# Patient Record
Sex: FEMALE | Race: WHITE | NOT HISPANIC OR LATINO | URBAN - METROPOLITAN AREA
[De-identification: names, ages, dates, MRNs, and addresses within clinical notes are randomized per-mention and may not be internally consistent; named-entity substitution may affect disease eponyms.]

---

## 2019-08-24 ENCOUNTER — INPATIENT (INPATIENT)
Facility: HOSPITAL | Age: 54
LOS: 0 days | Discharge: HOME | End: 2019-08-25
Attending: INTERNAL MEDICINE | Admitting: INTERNAL MEDICINE
Payer: MEDICAID

## 2019-08-24 VITALS
SYSTOLIC BLOOD PRESSURE: 135 MMHG | DIASTOLIC BLOOD PRESSURE: 71 MMHG | TEMPERATURE: 98 F | RESPIRATION RATE: 19 BRPM | OXYGEN SATURATION: 91 % | HEART RATE: 92 BPM

## 2019-08-24 DIAGNOSIS — Z98.891 HISTORY OF UTERINE SCAR FROM PREVIOUS SURGERY: Chronic | ICD-10-CM

## 2019-08-24 LAB
ALBUMIN SERPL ELPH-MCNC: 4.9 G/DL — SIGNIFICANT CHANGE UP (ref 3.5–5.2)
ALP SERPL-CCNC: 80 U/L — SIGNIFICANT CHANGE UP (ref 30–115)
ALT FLD-CCNC: 29 U/L — SIGNIFICANT CHANGE UP (ref 0–41)
ANION GAP SERPL CALC-SCNC: 12 MMOL/L — SIGNIFICANT CHANGE UP (ref 7–14)
AST SERPL-CCNC: 31 U/L — SIGNIFICANT CHANGE UP (ref 0–41)
BILIRUB SERPL-MCNC: <0.2 MG/DL — SIGNIFICANT CHANGE UP (ref 0.2–1.2)
BUN SERPL-MCNC: 15 MG/DL — SIGNIFICANT CHANGE UP (ref 10–20)
CALCIUM SERPL-MCNC: 10.1 MG/DL — SIGNIFICANT CHANGE UP (ref 8.5–10.1)
CHLORIDE SERPL-SCNC: 104 MMOL/L — SIGNIFICANT CHANGE UP (ref 98–110)
CO2 SERPL-SCNC: 26 MMOL/L — SIGNIFICANT CHANGE UP (ref 17–32)
CREAT SERPL-MCNC: 0.7 MG/DL — SIGNIFICANT CHANGE UP (ref 0.7–1.5)
GLUCOSE SERPL-MCNC: 105 MG/DL — HIGH (ref 70–99)
HCG SERPL QL: NEGATIVE — SIGNIFICANT CHANGE UP
HCT VFR BLD CALC: 44.8 % — SIGNIFICANT CHANGE UP (ref 37–47)
HGB BLD-MCNC: 14.5 G/DL — SIGNIFICANT CHANGE UP (ref 12–16)
MCHC RBC-ENTMCNC: 32.4 G/DL — SIGNIFICANT CHANGE UP (ref 32–37)
MCHC RBC-ENTMCNC: 34.5 PG — HIGH (ref 27–31)
MCV RBC AUTO: 106.7 FL — HIGH (ref 81–99)
NRBC # BLD: 0 /100 WBCS — SIGNIFICANT CHANGE UP (ref 0–0)
PLATELET # BLD AUTO: 292 K/UL — SIGNIFICANT CHANGE UP (ref 130–400)
POTASSIUM SERPL-MCNC: 4.7 MMOL/L — SIGNIFICANT CHANGE UP (ref 3.5–5)
POTASSIUM SERPL-SCNC: 4.7 MMOL/L — SIGNIFICANT CHANGE UP (ref 3.5–5)
PROT SERPL-MCNC: 7.6 G/DL — SIGNIFICANT CHANGE UP (ref 6–8)
RBC # BLD: 4.2 M/UL — SIGNIFICANT CHANGE UP (ref 4.2–5.4)
RBC # FLD: 12.5 % — SIGNIFICANT CHANGE UP (ref 11.5–14.5)
SODIUM SERPL-SCNC: 142 MMOL/L — SIGNIFICANT CHANGE UP (ref 135–146)
TROPONIN T SERPL-MCNC: <0.01 NG/ML — SIGNIFICANT CHANGE UP
WBC # BLD: 6.8 K/UL — SIGNIFICANT CHANGE UP (ref 4.8–10.8)
WBC # FLD AUTO: 6.8 K/UL — SIGNIFICANT CHANGE UP (ref 4.8–10.8)

## 2019-08-24 PROCEDURE — 93010 ELECTROCARDIOGRAM REPORT: CPT

## 2019-08-24 PROCEDURE — 99285 EMERGENCY DEPT VISIT HI MDM: CPT

## 2019-08-24 PROCEDURE — 71046 X-RAY EXAM CHEST 2 VIEWS: CPT | Mod: 26

## 2019-08-24 RX ORDER — IPRATROPIUM/ALBUTEROL SULFATE 18-103MCG
3 AEROSOL WITH ADAPTER (GRAM) INHALATION ONCE
Refills: 0 | Status: COMPLETED | OUTPATIENT
Start: 2019-08-24 | End: 2019-08-24

## 2019-08-24 RX ORDER — ALPRAZOLAM 0.25 MG
0.5 TABLET ORAL ONCE
Refills: 0 | Status: DISCONTINUED | OUTPATIENT
Start: 2019-08-24 | End: 2019-08-24

## 2019-08-24 RX ORDER — FLUTICASONE FUROATE AND VILANTEROL TRIFENATATE 100; 25 UG/1; UG/1
1 POWDER RESPIRATORY (INHALATION)
Qty: 0 | Refills: 0 | DISCHARGE

## 2019-08-24 RX ORDER — BUDESONIDE AND FORMOTEROL FUMARATE DIHYDRATE 160; 4.5 UG/1; UG/1
2 AEROSOL RESPIRATORY (INHALATION)
Refills: 0 | Status: DISCONTINUED | OUTPATIENT
Start: 2019-08-24 | End: 2019-08-25

## 2019-08-24 RX ORDER — IPRATROPIUM/ALBUTEROL SULFATE 18-103MCG
3 AEROSOL WITH ADAPTER (GRAM) INHALATION EVERY 6 HOURS
Refills: 0 | Status: DISCONTINUED | OUTPATIENT
Start: 2019-08-24 | End: 2019-08-25

## 2019-08-24 RX ORDER — NICOTINE POLACRILEX 2 MG
1 GUM BUCCAL DAILY
Refills: 0 | Status: DISCONTINUED | OUTPATIENT
Start: 2019-08-24 | End: 2019-08-25

## 2019-08-24 RX ORDER — MAGNESIUM SULFATE 500 MG/ML
2 VIAL (ML) INJECTION ONCE
Refills: 0 | Status: COMPLETED | OUTPATIENT
Start: 2019-08-24 | End: 2019-08-24

## 2019-08-24 RX ORDER — ENOXAPARIN SODIUM 100 MG/ML
40 INJECTION SUBCUTANEOUS DAILY
Refills: 0 | Status: DISCONTINUED | OUTPATIENT
Start: 2019-08-24 | End: 2019-08-25

## 2019-08-24 RX ORDER — ALBUTEROL 90 UG/1
10 AEROSOL, METERED ORAL ONCE
Refills: 0 | Status: COMPLETED | OUTPATIENT
Start: 2019-08-24 | End: 2019-08-24

## 2019-08-24 RX ADMIN — Medication 125 MILLIGRAM(S): at 17:56

## 2019-08-24 RX ADMIN — Medication 50 GRAM(S): at 18:53

## 2019-08-24 RX ADMIN — Medication 3 MILLILITER(S): at 17:56

## 2019-08-24 RX ADMIN — Medication 3 MILLILITER(S): at 18:53

## 2019-08-24 RX ADMIN — Medication 3 MILLILITER(S): at 18:12

## 2019-08-24 RX ADMIN — ALBUTEROL 10 MILLIGRAM(S): 90 AEROSOL, METERED ORAL at 19:53

## 2019-08-24 NOTE — H&P ADULT - ASSESSMENT
54y F w/ PMH of COPD, DLD presents with SOB for the past 3 days.     #COPD exacerbation   - C/w supplemental o2 prn , duonebs, IV steroids  - Will need outpatient pulmonary referral as she does not currently have a pulmonologist  - Anticipate dc in am on prednisone taper if symptomatically improved    DVT PPx: Lovenox sc  DASH diet  Full code  Ambulate as tolerated

## 2019-08-24 NOTE — H&P ADULT - NSHPLABSRESULTS_GEN_ALL_CORE
Vitals:    Vital Signs Last 24 Hrs  T(C): 37 (24 Aug 2019 21:44), Max: 37 (24 Aug 2019 21:44)  T(F): 98.6 (24 Aug 2019 21:44), Max: 98.6 (24 Aug 2019 21:44)  HR: 86 (24 Aug 2019 21:44) (86 - 92)  BP: 166/67 (24 Aug 2019 21:44) (135/71 - 166/67)  BP(mean): --  RR: 20 (24 Aug 2019 21:44) (19 - 20)  SpO2: 91% (24 Aug 2019 21:44) (91% - 91%)    Labs:     08-24    142  |  104  |  15  ----------------------------<  105<H>  4.7   |  26  |  0.7    Ca    10.1      24 Aug 2019 18:04    TPro  7.6  /  Alb  4.9  /  TBili  <0.2  /  DBili  x   /  AST  31  /  ALT  29  /  AlkPhos  80  08-24                          14.5   6.80  )-----------( 292      ( 24 Aug 2019 18:04 )             44.8     CARDIAC MARKERS ( 24 Aug 2019 18:04 )  x     / <0.01 ng/mL / x     / x     / x          LIVER FUNCTIONS - ( 24 Aug 2019 18:04 )  Alb: 4.9 g/dL / Pro: 7.6 g/dL / ALK PHOS: 80 U/L / ALT: 29 U/L / AST: 31 U/L / GGT: x

## 2019-08-24 NOTE — H&P ADULT - NSHPPHYSICALEXAM_GEN_ALL_CORE
PHYSICAL EXAM:  GEN: No acute distress  LUNGS: scattered wheezing  HEART: S1/S2 present. RRR.  ABD: Soft, non-tender, non-distended. Bowel sounds present  MSK: NC/NC/NE/2+PP/THOMPSON  NEURO: AAOX3, non-focal

## 2019-08-24 NOTE — H&P ADULT - HISTORY OF PRESENT ILLNESS
54y F w/ PMH of COPD, DLD presents with SOB for the past 3 days. Patient has recent URI with nasal congestion, productive cough for 3 days with associated SOB on exertion and wheezing. She went to  today was given neb treatment and was found to have low oxygen saturation on pulse ox and referred to ED for further management. Her symptoms improved. She denies any fevers, chills, CP, n/v/d/c, dysuria.     In ED vitals 166/67, HR 86, 91% on RA, afebrile

## 2019-08-24 NOTE — ED PROVIDER NOTE - PHYSICAL EXAMINATION
CONSTITUTIONAL: Well-developed; well-nourished; in no acute distress.   SKIN: warm, dry  CARD: S1, S2 normal; no murmurs, gallops, or rubs. Regular rate and rhythm.   RESP: Moderate expiratory wheeze diffusely. No rales or rhonchi.  ABD: soft ntnd  EXT: Normal ROM.  No clubbing, cyanosis or edema.   LYMPH: No acute cervical adenopathy.  NEURO: Alert, oriented, grossly unremarkable  PSYCH: Cooperative, appropriate.

## 2019-08-24 NOTE — ED PROVIDER NOTE - CLINICAL SUMMARY MEDICAL DECISION MAKING FREE TEXT BOX
54y F w/ PMH of COPD presents with SOB for the past 3 days. Had associated nasal congestion, cough, and sore throat at symptom onset. Went to  given duo nebs, noted to have sat @ 90% and sent to ED for eval. Vitals noted to have hypoxia @ 90%- goes up to 96% on. Physical- with diffuse wheeze. P EKG ordered, documented above. For imaging we ordered a CXR, and my preliminary read did not reveal, a ptx, infiltrates, or free air. Labs ordered and noted to be within normal limits. Despite persistent tx with duo neb and albuterol x 1 hr, pt had persistent wheezing and PF of 150. After the workup the decision was made admit the patient for asthma exacerbation. Extensive discussion had with the patient/family.

## 2019-08-24 NOTE — ED PROVIDER NOTE - OBJECTIVE STATEMENT
54y F w/ PMH of COPD presents with SOB for the past 3 days. Had associated nasal congestion, cough, and sore throat at symptom onset. States has been getting progressively worse. Went to urgent care and received breathing treatments with improvement. Was referred to ED due to low sat reading. Denies headache, fever, chills, CP, n/v, abd pain, or numbness/tingling.

## 2019-08-24 NOTE — ED PROVIDER NOTE - PROGRESS NOTE DETAILS
Pt refusing ABG at this time. Explained the benefit of assessing breathing status. Pt understands and would still like to refuse at this time.

## 2019-08-24 NOTE — ED PROVIDER NOTE - ATTENDING CONTRIBUTION TO CARE
HPI-As noted above, interviewed the patient myself & agreed w/ findings, additionally:54y F w/ PMH of COPD presents with SOB for the past 3 days. Had associated nasal congestion, cough, and sore throat at symptom onset. Went to  given duo nebs, noted to have sat @ 90% and sent to ED for eval. On my eval pt sitting comfortably In bed, speaking in full scentences, no retratcions.     ROS- As noted above & additionally:   (Positive): cough,sob    (Negative):  fever, chills, n/v, cp,  pleuritic cp, palpitations, diaphoresis, ha/dizziness, numbness/tingling, neck pain/ stiffness, abd pain, diarrhea, constipation, melena/brbpr, urinary symptoms, trauma, weakness, edema, calf pain/swelling/erythema, sick contacts, recent travel or rash.    Vital Signs: I have reviewed the initial VS.     PE- As noted above additionally:  General: Awake, alert, (-) acute distress  Eyes: PERRL, EOMI, nl  lids & conjunctivae, (-) icterus  ENT: nl ext inspection, pink/moist membranes, pharynx nl, (-) pharyngeal erythema/exudate  CV: S1S2, RRR, 2+pulses b/l, warm/well-perfused, (-) edema, (-) murmur/gallops/rubs/JVD  Respiratory: (+) wheezing diffuse bilat, (-)rales/rhonchi, nl voice, speaking full sentences, no retractions, no stridor  Abdomen: Soft, nl BS, (-)tender, (-)distended/guarding/rebound/CVA tenderness  Musculoskeletal: FROM all 4 extremities, N/V intact  Neck: FROM neck, supple, trachea midline (-) lymphadenopathy  Integumentary: nl color for race, warm and dry, (-)rash  Neuro: Oriented x3, CN 2-12 grossly intact motor/sensory  Psych: Oriented x3, nl normal/affect  LABS/ IMAGING- pCXR, pLabs, p Peak flow  RX-Duo nebs x 1hr, MgSo4, Steroids

## 2019-08-24 NOTE — H&P ADULT - ATTENDING COMMENTS
I agree with the above history ,physical and plan which I Have reviewed and examined independently  further plan pl see my notes documented separately

## 2019-08-25 VITALS — WEIGHT: 164.02 LBS | HEIGHT: 67 IN

## 2019-08-25 DIAGNOSIS — J44.1 CHRONIC OBSTRUCTIVE PULMONARY DISEASE WITH (ACUTE) EXACERBATION: ICD-10-CM

## 2019-08-25 LAB
ANION GAP SERPL CALC-SCNC: 13 MMOL/L — SIGNIFICANT CHANGE UP (ref 7–14)
BASOPHILS # BLD AUTO: 0.01 K/UL — SIGNIFICANT CHANGE UP (ref 0–0.2)
BASOPHILS NFR BLD AUTO: 0.1 % — SIGNIFICANT CHANGE UP (ref 0–1)
BUN SERPL-MCNC: 18 MG/DL — SIGNIFICANT CHANGE UP (ref 10–20)
CALCIUM SERPL-MCNC: 9.6 MG/DL — SIGNIFICANT CHANGE UP (ref 8.5–10.1)
CHLORIDE SERPL-SCNC: 105 MMOL/L — SIGNIFICANT CHANGE UP (ref 98–110)
CO2 SERPL-SCNC: 25 MMOL/L — SIGNIFICANT CHANGE UP (ref 17–32)
CREAT SERPL-MCNC: 0.6 MG/DL — LOW (ref 0.7–1.5)
EOSINOPHIL # BLD AUTO: 0 K/UL — SIGNIFICANT CHANGE UP (ref 0–0.7)
EOSINOPHIL NFR BLD AUTO: 0 % — SIGNIFICANT CHANGE UP (ref 0–8)
GLUCOSE SERPL-MCNC: 180 MG/DL — HIGH (ref 70–99)
HCT VFR BLD CALC: 39.9 % — SIGNIFICANT CHANGE UP (ref 37–47)
HGB BLD-MCNC: 13 G/DL — SIGNIFICANT CHANGE UP (ref 12–16)
IMM GRANULOCYTES NFR BLD AUTO: 0.3 % — SIGNIFICANT CHANGE UP (ref 0.1–0.3)
LYMPHOCYTES # BLD AUTO: 1.32 K/UL — SIGNIFICANT CHANGE UP (ref 1.2–3.4)
LYMPHOCYTES # BLD AUTO: 19.2 % — LOW (ref 20.5–51.1)
MCHC RBC-ENTMCNC: 32.6 G/DL — SIGNIFICANT CHANGE UP (ref 32–37)
MCHC RBC-ENTMCNC: 34.6 PG — HIGH (ref 27–31)
MCV RBC AUTO: 106.1 FL — HIGH (ref 81–99)
MONOCYTES # BLD AUTO: 0.32 K/UL — SIGNIFICANT CHANGE UP (ref 0.1–0.6)
MONOCYTES NFR BLD AUTO: 4.7 % — SIGNIFICANT CHANGE UP (ref 1.7–9.3)
NEUTROPHILS # BLD AUTO: 5.2 K/UL — SIGNIFICANT CHANGE UP (ref 1.4–6.5)
NEUTROPHILS NFR BLD AUTO: 75.7 % — HIGH (ref 42.2–75.2)
NRBC # BLD: 0 /100 WBCS — SIGNIFICANT CHANGE UP (ref 0–0)
PLATELET # BLD AUTO: 287 K/UL — SIGNIFICANT CHANGE UP (ref 130–400)
POTASSIUM SERPL-MCNC: 4.5 MMOL/L — SIGNIFICANT CHANGE UP (ref 3.5–5)
POTASSIUM SERPL-SCNC: 4.5 MMOL/L — SIGNIFICANT CHANGE UP (ref 3.5–5)
RBC # BLD: 3.76 M/UL — LOW (ref 4.2–5.4)
RBC # FLD: 12.8 % — SIGNIFICANT CHANGE UP (ref 11.5–14.5)
SODIUM SERPL-SCNC: 143 MMOL/L — SIGNIFICANT CHANGE UP (ref 135–146)
WBC # BLD: 6.87 K/UL — SIGNIFICANT CHANGE UP (ref 4.8–10.8)
WBC # FLD AUTO: 6.87 K/UL — SIGNIFICANT CHANGE UP (ref 4.8–10.8)

## 2019-08-25 RX ORDER — IPRATROPIUM/ALBUTEROL SULFATE 18-103MCG
3 AEROSOL WITH ADAPTER (GRAM) INHALATION
Qty: 0 | Refills: 0 | DISCHARGE
Start: 2019-08-25

## 2019-08-25 RX ORDER — CIPROFLOXACIN LACTATE 400MG/40ML
1 VIAL (ML) INTRAVENOUS
Qty: 5 | Refills: 0
Start: 2019-08-25 | End: 2019-08-29

## 2019-08-25 RX ADMIN — Medication 60 MILLIGRAM(S): at 05:55

## 2019-08-25 RX ADMIN — Medication 3 MILLILITER(S): at 07:57

## 2019-08-25 RX ADMIN — Medication 0.5 MILLIGRAM(S): at 00:11

## 2019-08-25 NOTE — DISCHARGE NOTE PROVIDER - CARE PROVIDER_API CALL
Filp Lovett)  Internal Medicine  [address] [place] [address]  Phone: [phone]  Fax: [phone]  Follow Up Time:

## 2019-08-25 NOTE — DISCHARGE NOTE PROVIDER - NSDCCPCAREPLAN_GEN_ALL_CORE_FT
PRINCIPAL DISCHARGE DIAGNOSIS  Diagnosis: COPD exacerbation  Assessment and Plan of Treatment: You were found to have COPD exacerbation as a result of a recent upper respiratory infection you had. Please follow up with your primary doctor and pulmonologist within 5 days of your discharge. You are being sent home with two prescriptions: One is for an antibiotic Levaquin that you will take for five days. The other is for Prednisone. Starting on 8/26/2019 (as you received IV steroids in the hospital) take two tablets for five days then take one tablet for five days.  You will also be needing to use your home inhaler every four hours.  If your symptoms reoccur; please contact your primary care doctor or return to the Emergency Department.

## 2019-08-25 NOTE — DISCHARGE NOTE NURSING/CASE MANAGEMENT/SOCIAL WORK - NSDCDPATPORTLINK_GEN_ALL_CORE
You can access the [facility] Patient Portal, offered by [facility], by registering with the following website: [email or web address]

## 2019-08-25 NOTE — PROGRESS NOTE ADULT - SUBJECTIVE AND OBJECTIVE BOX
SUBJECTIVE:    Patient is a 54y old Female who presents with a chief complaint of SOB (24 Aug 2019 22:28)  HPI:  54y F w/ PMH of COPD, DLD presents with SOB for the past 3 days. Patient has recent URI with nasal congestion, productive cough for 3 days with associated SOB on exertion and wheezing. She went to  today was given neb treatment and was found to have low oxygen saturation on pulse ox and referred to ED for further management. Her symptoms improved. She denies any fevers, chills, CP, n/v/d/c, dysuria.     In ED vitals 166/67, HR 86, 91% on RA, afebrile (24 Aug 2019 22:28)    Currently admitted to medicine with the primary diagnosis of COPD exacerbation     Today is hospital day 1d. This morning she is resting comfortably in bed and reports no new issues or overnight events.     PAST MEDICAL & SURGICAL HISTORY  Dyslipidemia  History of COPD  S/P     SOCIAL HISTORY:  Negative for smoking/alcohol/drug use.     ALLERGIES:  No Known Allergies    MEDICATIONS:  STANDING MEDICATIONS  ALBUTerol/ipratropium for Nebulization 3 milliLiter(s) Nebulizer every 6 hours  buDESOnide 160 MICROgram(s)/formoterol 4.5 MICROgram(s) Inhaler 2 Puff(s) Inhalation two times a day  enoxaparin Injectable 40 milliGRAM(s) SubCutaneous daily  methylPREDNISolone sodium succinate Injectable 60 milliGRAM(s) IV Push two times a day  nicotine - 21 mG/24Hr(s) Patch 1 patch Transdermal daily    PRN MEDICATIONS    VITALS:   T(F): 97  HR: 79  BP: 94/51  RR: 18  SpO2: 90%    LABS:                        13.0   6.87  )-----------( 287      ( 25 Aug 2019 06:44 )             39.9         143  |  105  |  18  ----------------------------<  180<H>  4.5   |  25  |  0.6<L>    Ca    9.6      25 Aug 2019 06:44    TPro  7.6  /  Alb  4.9  /  TBili  <0.2  /  DBili  x   /  AST  31  /  ALT  29  /  AlkPhos  80            Troponin T, Serum: <0.01 ng/mL (19 @ 18:04)      CARDIAC MARKERS ( 24 Aug 2019 18:04 )  x     / <0.01 ng/mL / x     / x     / x          RADIOLOGY:    PHYSICAL EXAM:  GEN: No acute distress  LUNGS: poor air entry   HEART: Regular  ABD: Soft, non-tender, non-distended.  EXT: NC/NC/NE/2+PP/THOMPSON/Skin Intact.   NEURO: AAOX3    Intravenous access:   NG tube:   Edwards Catheter:

## 2019-08-25 NOTE — CHART NOTE - NSCHARTNOTEFT_GEN_A_CORE
<<<RESIDENT DISCHARGE NOTE>>>     EAGLE PHOENIX  [id number]    VITAL SIGNS:  T(F): 97 (08-25-19 @ 04:54), Max: 98.6 (08-24-19 @ 21:44)  HR: 79 (08-25-19 @ 04:54)  BP: 94/51 (08-25-19 @ 04:54)  SpO2: 90% (08-25-19 @ 05:57)  Weight (kg): 74.4 (08-25-19 @ 08:42)  BMI (kg/m2): 25.7 (08-25-19 @ 08:42)    PHYSICAL EXAMINATION:  General: age appropriate, in no acute distress  Head & Neck: NCAT, PERRLA, EOMI,   Pulmonary: mild wheezing appreciated throughout all lung fields  Cardiovascular: RRR, S1S2, No m/r/g  Gastrointestinal/Abdomen & Pelvis: Soft, NTND, abdominal sounds present  Neurologic/Motor: Non focal; AAO x3    TEST RESULTS:                        13.0   6.87  )-----------( 287      ( 25 Aug 2019 06:44 )             39.9       08-25    143  |  105  |  18  ----------------------------<  180<H>  4.5   |  25  |  0.6<L>    Ca    9.6      25 Aug 2019 06:44    TPro  7.6  /  Alb  4.9  /  TBili  <0.2  /  DBili  x   /  AST  31  /  ALT  29  /  AlkPhos  80  08-24      FINAL DISCHARGE INTERVIEW:  Resident(s) Present: Jeremy Pérez RN Present: Amor    DISCHARGE MEDICATION RECONCILIATION  reviewed with Attending Dr. Fransisco Fisher    DISPOSITION:   [ x ] Home,    [  ] Home with Visiting Nursing Services,   [    ]  SNF/ NH,    [   ] Acute Rehab (4A),   [   ] Other (Specify:_________)

## 2019-08-25 NOTE — DISCHARGE NOTE PROVIDER - HOSPITAL COURSE
This is a 54 year old female with PMHx of COPD, DLD who presents with a 3 day history of SOB. The patient was found ot have a COPD exacerbation exacerbated by a recent URI with nasal congestion and coughing. The patient was evaluated overnight with IV steroids and nebulizer treatments. She improved overnight and is stable to be discharged home; as per the attending, with Levaquin and oral Prednisone. Patient will need outpatinet pulmonary follow up.

## 2019-08-26 LAB
HCV AB S/CO SERPL IA: 0.1 S/CO — SIGNIFICANT CHANGE UP (ref 0–0.99)
HCV AB SERPL-IMP: SIGNIFICANT CHANGE UP

## 2019-08-28 DIAGNOSIS — J44.1 CHRONIC OBSTRUCTIVE PULMONARY DISEASE WITH (ACUTE) EXACERBATION: ICD-10-CM

## 2019-08-28 DIAGNOSIS — F17.210 NICOTINE DEPENDENCE, CIGARETTES, UNCOMPLICATED: ICD-10-CM

## 2019-08-28 DIAGNOSIS — E78.5 HYPERLIPIDEMIA, UNSPECIFIED: ICD-10-CM

## 2019-08-28 DIAGNOSIS — R06.02 SHORTNESS OF BREATH: ICD-10-CM

## 2024-04-16 ENCOUNTER — OUTPATIENT (OUTPATIENT)
Dept: OUTPATIENT SERVICES | Facility: HOSPITAL | Age: 59
LOS: 1 days | End: 2024-04-16
Payer: COMMERCIAL

## 2024-04-16 DIAGNOSIS — K02.9 DENTAL CARIES, UNSPECIFIED: ICD-10-CM

## 2024-04-16 DIAGNOSIS — Z98.891 HISTORY OF UTERINE SCAR FROM PREVIOUS SURGERY: Chronic | ICD-10-CM

## 2024-04-16 PROBLEM — Z87.09 PERSONAL HISTORY OF OTHER DISEASES OF THE RESPIRATORY SYSTEM: Chronic | Status: ACTIVE | Noted: 2019-08-24

## 2024-04-16 PROBLEM — E78.5 HYPERLIPIDEMIA, UNSPECIFIED: Chronic | Status: ACTIVE | Noted: 2019-08-24

## 2024-04-16 PROCEDURE — D2331: CPT

## 2024-04-16 PROCEDURE — D0220: CPT

## 2024-04-16 PROCEDURE — D0140: CPT

## 2024-04-17 DIAGNOSIS — K02.9 DENTAL CARIES, UNSPECIFIED: ICD-10-CM

## 2025-07-10 PROBLEM — Z00.00 ENCOUNTER FOR PREVENTIVE HEALTH EXAMINATION: Status: ACTIVE | Noted: 2025-07-10

## 2025-07-12 ENCOUNTER — NON-APPOINTMENT (OUTPATIENT)
Age: 60
End: 2025-07-12

## 2025-07-14 ENCOUNTER — NON-APPOINTMENT (OUTPATIENT)
Age: 60
End: 2025-07-14

## 2025-07-14 ENCOUNTER — APPOINTMENT (OUTPATIENT)
Dept: SURGERY | Facility: CLINIC | Age: 60
End: 2025-07-14
Payer: MEDICAID

## 2025-07-14 VITALS — BODY MASS INDEX: 19.29 KG/M2 | WEIGHT: 120 LBS | HEIGHT: 66 IN

## 2025-07-14 PROBLEM — Z86.59 HISTORY OF ANXIETY: Status: RESOLVED | Noted: 2025-07-14 | Resolved: 2025-07-14

## 2025-07-14 PROBLEM — Z87.09 HISTORY OF ASTHMA: Status: RESOLVED | Noted: 2025-07-14 | Resolved: 2025-07-14

## 2025-07-14 PROBLEM — F17.210 SMOKES CIGARETTES: Status: ACTIVE | Noted: 2025-07-14

## 2025-07-14 PROBLEM — Z87.09 HISTORY OF CHRONIC OBSTRUCTIVE LUNG DISEASE: Status: RESOLVED | Noted: 2025-07-14 | Resolved: 2025-07-14

## 2025-07-14 PROBLEM — R10.30 UNILATERAL GROIN PAIN: Status: ACTIVE | Noted: 2025-07-14

## 2025-07-14 PROBLEM — Z86.69 HISTORY OF SEIZURE DISORDER: Status: RESOLVED | Noted: 2025-07-14 | Resolved: 2025-07-14

## 2025-07-14 PROCEDURE — 99204 OFFICE O/P NEW MOD 45 MIN: CPT

## 2025-07-23 ENCOUNTER — APPOINTMENT (OUTPATIENT)
Dept: NEUROLOGY | Facility: CLINIC | Age: 60
End: 2025-07-23

## 2025-07-31 ENCOUNTER — APPOINTMENT (OUTPATIENT)
Dept: NEUROSURGERY | Facility: CLINIC | Age: 60
End: 2025-07-31
Payer: MEDICAID

## 2025-07-31 VITALS — WEIGHT: 128 LBS | HEIGHT: 66 IN | BODY MASS INDEX: 20.57 KG/M2

## 2025-07-31 DIAGNOSIS — M54.18 RADICULOPATHY, SACRAL AND SACROCOCCYGEAL REGION: ICD-10-CM

## 2025-07-31 PROCEDURE — 99204 OFFICE O/P NEW MOD 45 MIN: CPT

## 2025-07-31 PROCEDURE — 99214 OFFICE O/P EST MOD 30 MIN: CPT

## 2025-07-31 RX ORDER — FLUTICASONE FUROATE AND VILANTEROL TRIFENATATE 100; 25 UG/1; UG/1
100-25 POWDER RESPIRATORY (INHALATION)
Refills: 0 | Status: ACTIVE | COMMUNITY

## 2025-08-12 ENCOUNTER — APPOINTMENT (OUTPATIENT)
Dept: NEUROLOGY | Facility: CLINIC | Age: 60
End: 2025-08-12

## 2025-09-09 ENCOUNTER — APPOINTMENT (OUTPATIENT)
Dept: NEUROLOGY | Facility: CLINIC | Age: 60
End: 2025-09-09

## 2025-09-15 ENCOUNTER — APPOINTMENT (OUTPATIENT)
Dept: NEUROSURGERY | Facility: CLINIC | Age: 60
End: 2025-09-15